# Patient Record
Sex: FEMALE | Race: BLACK OR AFRICAN AMERICAN
[De-identification: names, ages, dates, MRNs, and addresses within clinical notes are randomized per-mention and may not be internally consistent; named-entity substitution may affect disease eponyms.]

---

## 2017-04-02 ENCOUNTER — HOSPITAL ENCOUNTER (EMERGENCY)
Dept: HOSPITAL 17 - NETRI | Age: 62
Discharge: HOME | End: 2017-04-02
Payer: SELF-PAY

## 2017-04-02 VITALS
HEART RATE: 81 BPM | DIASTOLIC BLOOD PRESSURE: 98 MMHG | RESPIRATION RATE: 18 BRPM | OXYGEN SATURATION: 97 % | SYSTOLIC BLOOD PRESSURE: 189 MMHG | TEMPERATURE: 98.2 F

## 2017-04-02 VITALS — BODY MASS INDEX: 39.52 KG/M2 | WEIGHT: 231.49 LBS | HEIGHT: 64 IN
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
## 2017-04-02 DIAGNOSIS — S50.311A: ICD-10-CM

## 2017-04-02 DIAGNOSIS — S80.212A: Primary | ICD-10-CM

## 2017-04-02 PROCEDURE — E0113 CRUTCH UNDERARM EACH WOOD: HCPCS

## 2017-04-02 PROCEDURE — 99283 EMERGENCY DEPT VISIT LOW MDM: CPT

## 2017-04-02 PROCEDURE — 73564 X-RAY EXAM KNEE 4 OR MORE: CPT

## 2017-04-02 NOTE — RADRPT
EXAM DATE/TIME:  04/02/2017 09:51 

 

[facility] COMPARISON:     No previous studies available for comparison.

 

                     

INDICATIONS :     Left knee injury, fall walking across the street landed on patella. Pain in anterio
r portion of knee.

                     

MEDICAL HISTORY :     None.          

SURGICAL HISTORY :     None.   

ENCOUNTER:     Initial                                        

ACUITY:     1 day      

PAIN SCORE:     10/10

LOCATION:     Left knee

 

FINDINGS:     

There are degenerative changes in the knee with loss of articular cartilage in the medial compartment
.

 

There is no evidence for joint effusion.

 

Alignment is anatomic.  

 

CONCLUSION:     

Degenerative changes without fracture.

 

 Burak Barrow MD FACR on April 02, 2017 at 9:59                

Board Certified Radiologist.

 This report was verified electronically.

## 2017-04-02 NOTE — PD
HPI


Chief Complaint:  Fall


Time Seen by Provider:  09:22


Travel History


International Travel<30 days:  No


Contact w/Intl Traveler<30days:  No


Traveled to known affect area:  No





History of Present Illness


HPI


Patient is a 61-year-old female presented to emergency department for 

evaluation of left knee pain and right elbow pain.  Patient states she was 

walking into work when she tripped, falling onto her knees and right elbow.  

Patient states the pain is 8 out of 10 and describes it as sore.  Patient has 

abrasion to the left knee and right elbow.  She denies any head injury or loss 

of consciousness.  She denies any back pain, numbness or tingling in her lower 

extremities.  She denies any chest pain or shortness of breath prior to the 

fall.  Patient's other complaints at this time.





PFSH


Past Medical History


High Cholesterol:  Yes


Menopausal:  Yes


:  2


Para:  2


Tubal Ligation:  Yes





Past Surgical History


 Section:  Yes (X1)


Hysterectomy:  Yes (PARTIAL)





Social History


Alcohol Use:  No


Tobacco Use:  No


Substance Use:  No





Allergies-Medications


(Allergen,Severity, Reaction):  


Coded Allergies:  


     No Known Allergies (Verified , 17)


Reported Meds & Prescriptions





Reported Meds & Active Scripts


Active


Ibuprofen 800 Mg Tab 800 Mg PO Q8H PRN








Review of Systems


Except as stated in HPI:  all other systems reviewed are Neg


Musculoskeletal:  Positive: Myalgias, Pain


Skin:  Positive Other (abrasions)





Physical Exam


Narrative


GENERAL: Well-nourished, well-developed patient.


SKIN: Focused skin assessment warm/dry.  Superficial abrasion to the left 

anterior knee and right forearm just distal to the elbow


HEAD: Normocephalic.


EYES: No scleral icterus. No injection or drainage. 


NECK: Supple, trachea midline. No JVD or lymphadenopathy.


CARDIOVASCULAR: Regular rate and rhythm without murmurs, gallops, or rubs. 


RESPIRATORY: Breath sounds equal bilaterally. No accessory muscle use.


GASTROINTESTINAL: Abdomen soft, non-tender, nondistended. 


MUSCULOSKELETAL: No cyanosis, or edema.  Full range of motion in all 4 

extremities.  No obvious deformities noted.  5/5 muscle strength in all 4 

extremities.  Patient is neurovascularly intact.


BACK: Nontender without obvious deformity. No CVA tenderness.





Data


Data


Last Documented VS





Vital Signs








  Date Time  Temp Pulse Resp B/P Pulse Ox O2 Delivery O2 Flow Rate FiO2


 


17 09:03 98.2 81 18 189/98 97   








Orders





 Knee, Complete (4vws) (17 )


Wound Care (17 09:18)


Crutches (17 10:17)








MDM


Medical Decision Making


Medical Screen Exam Complete:  Yes


Emergency Medical Condition:  Yes


Interpretation(s)





Vital Signs








  Date Time  Temp Pulse Resp B/P Pulse Ox O2 Delivery O2 Flow Rate FiO2


 


17 09:03 98.2 81 18 189/98 97   








Differential Diagnosis


Strain versus sprain versus effusion versus fracture versus abrasions versus 

laceration versus other


Narrative Course


Patient is a 61-year-old female presenting to the emergency department for 

evaluation of left knee and right elbow pain after sustaining a mechanical fall 

while walking into work this morning.  Patient denies any head injury or loss 

of consciousness.  Patient is neurovascularly and neurologically intact.  

Imaging of the left knee ordered and pending.  Wound care ordered.


Imaging of left knee is negative for acute fracture, does show degenerative 

changes.  Wound care was performed.  Patient be given crutches for support 

however she is encouraged to engage in range of motion exercises, alternate 

heat and ice to affected area, avoid bed rest, increase weightbearing as 

tolerated.  She is encouraged follow-up with a primary doctor.  She is 

encouraged to return to emergency department for any new or worsening symptoms.

  Patient verbalized understanding of these instructions.  Patient is stable 

for discharge.





Diagnosis





 Primary Impression:  


 Abrasions of multiple sites


 Additional Impression:  


 Knee pain


 Qualified Code:  M25.562 - Acute pain of left knee


Referrals:  


Primary Care Physician


Patient Instructions:  General Instructions, Knee Exercises (GEN), Knee Pain (ED

)


Departure Forms:  Tests/Procedures, Work Release   Enter return to work date:  

2017





***Additional Instructions:


Follow-up with her primary doctor


Continue range of motion exercises, alternate heat and ice the affected area, 

increase weightbearing as tolerated


Use crutches for support


Return to emergency department for any new or worsening symptoms


Keep abrasions clean and dry, wash with soap and water, apply topical 

antibiotic ointment and nonadherent dressing.  Do not continue to use peroxide.


***Med/Other Pt SpecificInfo:  Prescription(s) given


Scripts


Ibuprofen 800 Mg Uwt215 Mg PO Q8H PRN (Pain/Inflammation) #30 TAB  Ref 0


   Prov:Nichol Liriano         17


Disposition:  01 DISCHARGE HOME


Condition:  Stable








Heri,Bertamoustapha CARPENTER 2017 09:27

## 2018-02-07 ENCOUNTER — HOSPITAL ENCOUNTER (EMERGENCY)
Dept: HOSPITAL 17 - NED | Age: 63
Discharge: LEFT BEFORE DISCHARGE | End: 2018-02-07
Payer: SELF-PAY

## 2018-02-07 VITALS
DIASTOLIC BLOOD PRESSURE: 87 MMHG | SYSTOLIC BLOOD PRESSURE: 191 MMHG | TEMPERATURE: 97.4 F | HEART RATE: 78 BPM | OXYGEN SATURATION: 96 % | RESPIRATION RATE: 16 BRPM

## 2018-02-07 DIAGNOSIS — Z53.21: ICD-10-CM

## 2018-02-07 DIAGNOSIS — R10.9: Primary | ICD-10-CM

## 2018-02-07 PROCEDURE — 99281 EMR DPT VST MAYX REQ PHY/QHP: CPT

## 2018-02-08 NOTE — PD
Physical Exam


Date Seen by Provider:  Feb 7, 2018


Time Seen by Provider:  21:05


Narrative


62-year-old female presents to the emergency department for evaluation.  

Umbilical abdominal pain that started yesterday.  Pain is currently 8/10.





Data


Data


Last Documented VS





Vital Signs








  Date Time  Temp Pulse Resp B/P (MAP) Pulse Ox O2 Delivery O2 Flow Rate FiO2


 


2/7/18 19:19 97.4 78 16 191/87 (121) 96   











MDM


Supervised Visit with MILE:  No


Narrative Course


62-year-old female presents to the emergency department for evaluation of 

abdominal pain that started yesterday.  She was initially seen in triage.  She 

left AGAINST MEDICAL ADVICE before she could be moved to medical bed.


Diagnosis





 Primary Impression:  


 Left against medical advice


Patient Instructions:  General Instructions


Departure Forms:  Tests/Procedures


Disposition:  07 AGAINST MEDICAL ADVICE











Mignon Carrillo Feb 8, 2018 09:11